# Patient Record
Sex: FEMALE | Race: WHITE | Employment: FULL TIME | URBAN - METROPOLITAN AREA
[De-identification: names, ages, dates, MRNs, and addresses within clinical notes are randomized per-mention and may not be internally consistent; named-entity substitution may affect disease eponyms.]

---

## 2017-02-16 PROBLEM — Z34.90 PREGNANCY: Status: ACTIVE | Noted: 2017-02-16

## 2017-02-16 NOTE — NST
Nonstress Test   Patient: Preethi So    Gestation: 31w5d    NST:       Variability: Moderate           Accelerations: Yes           Decelerations: None            Baseline: 145 BPM           Uterine Irritability: No           Contractions: Irregular

## 2017-02-16 NOTE — PROGRESS NOTES
Discharge instructions given, pt verbalized understanding. Pt ambulated to car in stable condition accompanied by family member.

## 2017-02-16 NOTE — PROGRESS NOTES
Pt is a 39year old female admitted to [facility], Patient presents with:  Medical Complication: r/o flu      Pt is 31w5d intra-uterine pregnancy. Denies any leaking of fluid. Reports +fetal movement. History obtained, consents signed.  Oriented to room, s

## 2022-03-06 NOTE — ED INITIAL ASSESSMENT (HPI)
PT states that she was directed to come to the E.D. for further evaluation due to a COVID positive test at home, low grade fever at home, body aches, sinus congestion, post nasal drip and cough at night for one day.  PT denies shortness of breath, took Tylenol at 1100 hours prior to arrival.

## 2022-03-07 NOTE — ED QUICK NOTES
Discharge teaching provided: IS, pulse ox, deep breathing exercises, activity as tolerated based on SpO2, rest and fluids.

## 2022-03-24 NOTE — ED INITIAL ASSESSMENT (HPI)
Pt was sent here from ob/gyne for high bp. Pt is here after d+c last week. Pt having lightheaded. Pt had pre eclampsia w both births before.

## 2024-01-11 ENCOUNTER — OFFICE VISIT (OUTPATIENT)
Dept: FAMILY MEDICINE CLINIC | Facility: CLINIC | Age: 44
End: 2024-01-11
Payer: COMMERCIAL

## 2024-01-11 VITALS
TEMPERATURE: 97 F | DIASTOLIC BLOOD PRESSURE: 84 MMHG | SYSTOLIC BLOOD PRESSURE: 148 MMHG | HEIGHT: 64 IN | OXYGEN SATURATION: 97 % | BODY MASS INDEX: 31.58 KG/M2 | WEIGHT: 185 LBS | HEART RATE: 75 BPM | RESPIRATION RATE: 18 BRPM

## 2024-01-11 DIAGNOSIS — R39.9 UTI SYMPTOMS: Primary | ICD-10-CM

## 2024-01-11 LAB
APPEARANCE: CLEAR
BILIRUBIN: NEGATIVE
GLUCOSE (URINE DIPSTICK): NEGATIVE MG/DL
KETONES (URINE DIPSTICK): NEGATIVE MG/DL
LEUKOCYTES: NEGATIVE
MULTISTIX LOT#: NORMAL NUMERIC
NITRITE, URINE: NEGATIVE
OCCULT BLOOD: NEGATIVE
PH, URINE: 6 (ref 4.5–8)
PROTEIN (URINE DIPSTICK): NEGATIVE MG/DL
SPECIFIC GRAVITY: 1 (ref 1–1.03)
URINE-COLOR: YELLOW
UROBILINOGEN,SEMI-QN: 0.2 MG/DL (ref 0–1.9)

## 2024-01-11 PROCEDURE — 87086 URINE CULTURE/COLONY COUNT: CPT | Performed by: PHYSICIAN ASSISTANT

## 2024-01-11 PROCEDURE — 3077F SYST BP >= 140 MM HG: CPT | Performed by: PHYSICIAN ASSISTANT

## 2024-01-11 PROCEDURE — 81003 URINALYSIS AUTO W/O SCOPE: CPT | Performed by: PHYSICIAN ASSISTANT

## 2024-01-11 PROCEDURE — 3008F BODY MASS INDEX DOCD: CPT | Performed by: PHYSICIAN ASSISTANT

## 2024-01-11 PROCEDURE — 3079F DIAST BP 80-89 MM HG: CPT | Performed by: PHYSICIAN ASSISTANT

## 2024-01-11 PROCEDURE — 99213 OFFICE O/P EST LOW 20 MIN: CPT | Performed by: PHYSICIAN ASSISTANT

## 2024-01-11 NOTE — PROGRESS NOTES
CHIEF COMPLAINT:     Chief Complaint   Patient presents with    UTI     Started 2 days ago, urgency          HPI:   Shayla Thacker is a 43 year old female who presents with concerns for having UTI. Complaining of urinary frequency and  urgeny but no dysuria.  She sometimes has a feeling of bladder fullness.  Symptoms for 2 days. Today she woke  up and she noted some lower back pain and sore thighs prompting her to come to the [facility].  She had been shoveling snow but no pain with bending and twisting. Symptoms have been stable since onset.  Treatments tried: none.  Denies flank pain, fever, hematuria, nausea, or vomiting.  Denies vaginal discharge or complaints.     She has  had UTI before.  Her current symptoms are a little different in  that she does not have any burning symptoms.       She has no history of renal stones, pyelonephritis or other urological problems.       No current outpatient medications on file.      Past Medical History:   Diagnosis Date    Allergic rhinitis 3/12/2012    Allergic rhinitis, cause unspecified     Asthma     ASTHMA     Asthma 3/12/2012    Hyperlipidemia 3/12/2012    Knee pain 3/12/2012    Pregnancy-induced hypertension     last pregnancy      Social History:  Social History     Socioeconomic History    Marital status:    Tobacco Use    Smoking status: Never    Smokeless tobacco: Never   Substance and Sexual Activity    Alcohol use: Not Currently     Comment: occasional    Drug use: No   Social History Narrative    : single    Children: no    Exercise: bella    Employment: [employer]    Caffeine intake: 2 diet Coke/d                 REVIEW OF SYSTEMS:   GENERAL: Denies fever, chills, or body aches  SKIN: no rashes, no skin wounds or ulcers.  EYES:denies blurred vision or double vision  HEENT: no congestion, rhinorrhea, sore throat or ear pain  CARDIOVASCULAR: denies chest pain or palpitations  LUNGS: denies shortness of breath, cough, or wheezing  GI: See  HPI. No N/V/C/D.   : See HPI.  NEURO: no headaches.    EXAM:   /80   Pulse 75   Temp 97.4 °F (36.3 °C)   Resp 18   Ht 5' 4\" (1.626 m)   Wt 185 lb (83.9 kg)   LMP 12/27/2023 (Approximate)   SpO2 97%   BMI 31.76 kg/m²   GENERAL: well developed, well nourished,in no apparent distress  CARDIO: RRR, no murmurs  LUNGS: clear to ausculation bilaterally, no wheezing or rhonchi  GI: BS present x 4.  No hepatosplenomegaly.  : no suprapubic tenderness. No bladder distention, or CVAT     Recent Results (from the past 24 hour(s))   URINALYSIS, AUTO, W/O SCOPE    Collection Time: 01/11/24  1:46 PM   Result Value Ref Range    Glucose Urine Negative Negative mg/dL    Bilirubin Urine Negative Negative    Ketones, UA Negative Negative - Trace mg/dL    Spec Gravity 1.005 1.005 - 1.030    Blood Urine Negative Negative    PH Urine 6.0 5.0 - 8.0    Protein Urine Negative Negative - Trace mg/dL    Urobilinogen Urine 0.2 0.2 - 1.0 mg/dL    Nitrite Urine Negative Negative    Leukocyte Esterase Urine Negative Negative    APPEARANCE clear Clear    Color Urine yellow Yellow    Multistix Lot# [id number] Numeric    Multistix Expiration Date 08/31/2024 Date         ASSESSMENT AND PLAN:   Shayla Thacker is a 43 year old female presents with UTI symptoms.    ASSESSMENT:  Encounter Diagnosis   Name Primary?    UTI symptoms Yes     Her urine is benign and not convincing for UTI.  Urine cultures sent.  Discussed follow up PCP if symptoms not explained with cultures.        PLAN: Meds as listed below.  Push fluids.  Comfort measures as described in Patient Instructions    No history HTN.  Asymptomatic without chest  pain or sob. Blood pressure moderately elevated.  Monitor and Follow up PCP.    Meds & Refills for this Visit:  Requested Prescriptions      No prescriptions requested or ordered in this encounter       Risk and benefits of medication discussed. Stressed importance of completing full course of antibiotic unless told  otherwise.     There are no Patient Instructions on file for this visit.      The patient indicates understanding of these issues and agrees to the plan.    The patient is asked to follow up with PCP  if not improving.  Advised to go to ED with development of fever, vomiting, abdominal pain, gross hematuria or other worsening symptoms.

## 2024-07-16 NOTE — RESPIRATORY THERAPY NOTE
Nasal swab performed for  Resp. FLU panel expanded  , left nostril, specimen obtained, labeled and sent to lab. All ppe worn during procedure. Well tolerated by patient. Detail Level: Detailed

## (undated) NOTE — IP AVS SNAPSHOT
[facility] [place] [address] [place], [address] ~ [phone]                Discharge Summary   2/16/2017    Natividad Banerjee           Admission Information        Provider Department    2/16/2017 Daryle Cooley, MD  [facility]      Why you 100F;Vaginal bleeding;Decrease in fetal movement;Vaginal or rectal pressure                      Preeclampsia/Hypertension       Preeclampsia is a serious disease related to high blood pressure (hypertension).   Preeclampsia/hypertension can happen during p 0.90 (02/16/17)  0.66 (H) (02/16/17)  0.06 (02/16/17)  0.03      Radiology Exams     None      Patient Belongings       Most Recent Value    All belongings returned to patient at discharge Pt's bedside belongings    Medications Sent Home None to return